# Patient Record
Sex: MALE | Race: BLACK OR AFRICAN AMERICAN | HISPANIC OR LATINO
[De-identification: names, ages, dates, MRNs, and addresses within clinical notes are randomized per-mention and may not be internally consistent; named-entity substitution may affect disease eponyms.]

---

## 2017-01-24 ENCOUNTER — TRANSCRIPTION ENCOUNTER (OUTPATIENT)
Age: 22
End: 2017-01-24

## 2017-01-24 ENCOUNTER — EMERGENCY (EMERGENCY)
Facility: HOSPITAL | Age: 22
LOS: 1 days | Discharge: LEFT BEFORE DISCHARGE | End: 2017-01-24
Admitting: EMERGENCY MEDICINE

## 2017-01-24 VITALS
RESPIRATION RATE: 18 BRPM | OXYGEN SATURATION: 99 % | TEMPERATURE: 99 F | SYSTOLIC BLOOD PRESSURE: 136 MMHG | HEART RATE: 78 BPM | HEIGHT: 76 IN | WEIGHT: 175.05 LBS | DIASTOLIC BLOOD PRESSURE: 81 MMHG
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
## 2017-01-27 ENCOUNTER — TRANSCRIPTION ENCOUNTER (OUTPATIENT)
Age: 22
End: 2017-01-27

## 2017-06-26 ENCOUNTER — EMERGENCY (EMERGENCY)
Facility: HOSPITAL | Age: 22
LOS: 1 days | Discharge: LEFT BEFORE DISCHARGE | End: 2017-06-26
Admitting: EMERGENCY MEDICINE

## 2017-06-26 VITALS
SYSTOLIC BLOOD PRESSURE: 121 MMHG | RESPIRATION RATE: 18 BRPM | HEIGHT: 76 IN | WEIGHT: 164.91 LBS | OXYGEN SATURATION: 97 % | HEART RATE: 85 BPM | DIASTOLIC BLOOD PRESSURE: 85 MMHG | TEMPERATURE: 98 F

## 2017-06-26 NOTE — ED ADULT TRIAGE NOTE - CHIEF COMPLAINT QUOTE
c/o lightheaded, feels dehydrated and his heart racing, states was drinking couple of hrs ago beer and couple of shots,

## 2017-06-27 ENCOUNTER — EMERGENCY (EMERGENCY)
Facility: HOSPITAL | Age: 22
LOS: 1 days | Discharge: HOME | End: 2017-06-27
Attending: EMERGENCY MEDICINE
Payer: MEDICAID

## 2017-06-27 VITALS
RESPIRATION RATE: 18 BRPM | SYSTOLIC BLOOD PRESSURE: 126 MMHG | DIASTOLIC BLOOD PRESSURE: 76 MMHG | HEART RATE: 65 BPM | OXYGEN SATURATION: 100 %

## 2017-06-27 VITALS
RESPIRATION RATE: 18 BRPM | TEMPERATURE: 98 F | WEIGHT: 164.91 LBS | HEIGHT: 76 IN | HEART RATE: 120 BPM | OXYGEN SATURATION: 100 % | DIASTOLIC BLOOD PRESSURE: 96 MMHG | SYSTOLIC BLOOD PRESSURE: 156 MMHG

## 2017-06-27 LAB
ALBUMIN SERPL ELPH-MCNC: 4.6 G/DL — SIGNIFICANT CHANGE UP (ref 3.3–5.2)
ALP SERPL-CCNC: 57 U/L — SIGNIFICANT CHANGE UP (ref 40–120)
ALT FLD-CCNC: 16 U/L — SIGNIFICANT CHANGE UP
ANION GAP SERPL CALC-SCNC: 21 MMOL/L — HIGH (ref 5–17)
AST SERPL-CCNC: 34 U/L — SIGNIFICANT CHANGE UP
BASOPHILS # BLD AUTO: 0 K/UL — SIGNIFICANT CHANGE UP (ref 0–0.2)
BASOPHILS NFR BLD AUTO: 0.7 % — SIGNIFICANT CHANGE UP (ref 0–2)
BILIRUB SERPL-MCNC: 0.5 MG/DL — SIGNIFICANT CHANGE UP (ref 0.4–2)
BUN SERPL-MCNC: 11 MG/DL — SIGNIFICANT CHANGE UP (ref 8–20)
CALCIUM SERPL-MCNC: 9.5 MG/DL — SIGNIFICANT CHANGE UP (ref 8.6–10.2)
CHLORIDE SERPL-SCNC: 97 MMOL/L — LOW (ref 98–107)
CO2 SERPL-SCNC: 17 MMOL/L — LOW (ref 22–29)
CREAT SERPL-MCNC: 0.96 MG/DL — SIGNIFICANT CHANGE UP (ref 0.5–1.3)
EOSINOPHIL # BLD AUTO: 0.2 K/UL — SIGNIFICANT CHANGE UP (ref 0–0.5)
EOSINOPHIL NFR BLD AUTO: 2.9 % — SIGNIFICANT CHANGE UP (ref 0–5)
ETHANOL SERPL-MCNC: <10 MG/DL — SIGNIFICANT CHANGE UP
GLUCOSE SERPL-MCNC: 90 MG/DL — SIGNIFICANT CHANGE UP (ref 70–115)
HCT VFR BLD CALC: 42.7 % — SIGNIFICANT CHANGE UP (ref 42–52)
HGB BLD-MCNC: 14.6 G/DL — SIGNIFICANT CHANGE UP (ref 14–18)
LYMPHOCYTES # BLD AUTO: 3 K/UL — SIGNIFICANT CHANGE UP (ref 1–4.8)
LYMPHOCYTES # BLD AUTO: 40.7 % — SIGNIFICANT CHANGE UP (ref 20–55)
MCHC RBC-ENTMCNC: 28.7 PG — SIGNIFICANT CHANGE UP (ref 27–31)
MCHC RBC-ENTMCNC: 34.2 G/DL — SIGNIFICANT CHANGE UP (ref 32–36)
MCV RBC AUTO: 84.1 FL — SIGNIFICANT CHANGE UP (ref 80–94)
MONOCYTES # BLD AUTO: 0.4 K/UL — SIGNIFICANT CHANGE UP (ref 0–0.8)
MONOCYTES NFR BLD AUTO: 5.9 % — SIGNIFICANT CHANGE UP (ref 3–10)
NEUTROPHILS # BLD AUTO: 3.6 K/UL — SIGNIFICANT CHANGE UP (ref 1.8–8)
NEUTROPHILS NFR BLD AUTO: 49.5 % — SIGNIFICANT CHANGE UP (ref 37–73)
PLATELET # BLD AUTO: 190 K/UL — SIGNIFICANT CHANGE UP (ref 150–400)
POTASSIUM SERPL-MCNC: 4.3 MMOL/L — SIGNIFICANT CHANGE UP (ref 3.5–5.3)
POTASSIUM SERPL-SCNC: 4.3 MMOL/L — SIGNIFICANT CHANGE UP (ref 3.5–5.3)
PROT SERPL-MCNC: 7.9 G/DL — SIGNIFICANT CHANGE UP (ref 6.6–8.7)
RBC # BLD: 5.08 M/UL — SIGNIFICANT CHANGE UP (ref 4.6–6.2)
RBC # FLD: 13.6 % — SIGNIFICANT CHANGE UP (ref 11–15.6)
SODIUM SERPL-SCNC: 135 MMOL/L — SIGNIFICANT CHANGE UP (ref 135–145)
T4 AB SER-ACNC: 4.6 UG/DL — SIGNIFICANT CHANGE UP (ref 4.5–12)
TROPONIN T SERPL-MCNC: <0.01 NG/ML — SIGNIFICANT CHANGE UP (ref 0–0.06)
TSH SERPL-MCNC: 1.7 UIU/ML — SIGNIFICANT CHANGE UP (ref 0.27–4.2)
WBC # BLD: 7.3 K/UL — SIGNIFICANT CHANGE UP (ref 4.8–10.8)
WBC # FLD AUTO: 7.3 K/UL — SIGNIFICANT CHANGE UP (ref 4.8–10.8)

## 2017-06-27 PROCEDURE — 80307 DRUG TEST PRSMV CHEM ANLYZR: CPT

## 2017-06-27 PROCEDURE — 99284 EMERGENCY DEPT VISIT MOD MDM: CPT | Mod: 25

## 2017-06-27 PROCEDURE — 80053 COMPREHEN METABOLIC PANEL: CPT

## 2017-06-27 PROCEDURE — 36415 COLL VENOUS BLD VENIPUNCTURE: CPT

## 2017-06-27 PROCEDURE — 85027 COMPLETE CBC AUTOMATED: CPT

## 2017-06-27 PROCEDURE — 84484 ASSAY OF TROPONIN QUANT: CPT

## 2017-06-27 PROCEDURE — 99285 EMERGENCY DEPT VISIT HI MDM: CPT | Mod: 25

## 2017-06-27 PROCEDURE — 84443 ASSAY THYROID STIM HORMONE: CPT

## 2017-06-27 PROCEDURE — 84436 ASSAY OF TOTAL THYROXINE: CPT

## 2017-06-27 PROCEDURE — 96374 THER/PROPH/DIAG INJ IV PUSH: CPT

## 2017-06-27 RX ORDER — MAGNESIUM OXIDE 400 MG ORAL TABLET 241.3 MG
0 TABLET ORAL
Qty: 0 | Refills: 0 | COMMUNITY

## 2017-06-27 RX ORDER — SODIUM CHLORIDE 9 MG/ML
2000 INJECTION INTRAMUSCULAR; INTRAVENOUS; SUBCUTANEOUS ONCE
Qty: 0 | Refills: 0 | Status: COMPLETED | OUTPATIENT
Start: 2017-06-27 | End: 2017-06-27

## 2017-06-27 RX ADMIN — SODIUM CHLORIDE 2000 MILLILITER(S): 9 INJECTION INTRAMUSCULAR; INTRAVENOUS; SUBCUTANEOUS at 05:41

## 2017-06-27 NOTE — ED PROVIDER NOTE - PROGRESS NOTE DETAILS
after valium and nonrebreather mask - symptoms resolved and tachycardia resolved.  d/c home with instruction on anxiety, hyperventiltion, and binge drinking.

## 2017-06-27 NOTE — ED PROVIDER NOTE - OBJECTIVE STATEMENT
23 yo male with a h/o anxiety complains of palpitations.  he admits to binge drinking for past 2 nites.  denies any pain, sob, n/v.  similar symptoms in past.  came to ER earlier but walked out bc feeling better.   symptoms occurred while at rest...this time with assoc. paresthesias to both hands/feet, and carpopedal spasm.

## 2017-06-27 NOTE — ED PROVIDER NOTE - CONSTITUTIONAL, MLM
normal... Well appearing, well nourished, awake, alert, oriented to person, place, time/situation and in no apparent distress. anxious.

## 2017-06-27 NOTE — ED ADULT TRIAGE NOTE - CHIEF COMPLAINT QUOTE
pt returned from earlier c/o palpation states he went home and sleep but woke up with palpation, denies any drugs

## 2018-01-02 ENCOUNTER — TRANSCRIPTION ENCOUNTER (OUTPATIENT)
Age: 23
End: 2018-01-02

## 2019-05-02 ENCOUNTER — EMERGENCY (EMERGENCY)
Facility: HOSPITAL | Age: 24
LOS: 1 days | Discharge: HOME | End: 2019-05-02
Attending: EMERGENCY MEDICINE
Payer: MEDICAID

## 2019-05-02 VITALS
RESPIRATION RATE: 18 BRPM | SYSTOLIC BLOOD PRESSURE: 114 MMHG | HEART RATE: 164 BPM | DIASTOLIC BLOOD PRESSURE: 72 MMHG | WEIGHT: 145.06 LBS | TEMPERATURE: 98 F | HEIGHT: 76 IN

## 2019-05-02 LAB
BASOPHILS # BLD AUTO: 0 K/UL — SIGNIFICANT CHANGE UP (ref 0–0.2)
BASOPHILS NFR BLD AUTO: 0.2 % — SIGNIFICANT CHANGE UP (ref 0–2)
EOSINOPHIL # BLD AUTO: 0 K/UL — SIGNIFICANT CHANGE UP (ref 0–0.5)
EOSINOPHIL NFR BLD AUTO: 0.1 % — SIGNIFICANT CHANGE UP (ref 0–5)
HCT VFR BLD CALC: 42.6 % — SIGNIFICANT CHANGE UP (ref 42–52)
HGB BLD-MCNC: 14.3 G/DL — SIGNIFICANT CHANGE UP (ref 14–18)
LYMPHOCYTES # BLD AUTO: 1.3 K/UL — SIGNIFICANT CHANGE UP (ref 1–4.8)
LYMPHOCYTES # BLD AUTO: 12.7 % — LOW (ref 20–55)
MCHC RBC-ENTMCNC: 28.4 PG — SIGNIFICANT CHANGE UP (ref 27–31)
MCHC RBC-ENTMCNC: 33.6 G/DL — SIGNIFICANT CHANGE UP (ref 32–36)
MCV RBC AUTO: 84.7 FL — SIGNIFICANT CHANGE UP (ref 80–94)
MONOCYTES # BLD AUTO: 0.6 K/UL — SIGNIFICANT CHANGE UP (ref 0–0.8)
MONOCYTES NFR BLD AUTO: 5.7 % — SIGNIFICANT CHANGE UP (ref 3–10)
NEUTROPHILS # BLD AUTO: 8.6 K/UL — HIGH (ref 1.8–8)
NEUTROPHILS NFR BLD AUTO: 81.2 % — HIGH (ref 37–73)
PLATELET # BLD AUTO: 210 K/UL — SIGNIFICANT CHANGE UP (ref 150–400)
RBC # BLD: 5.03 M/UL — SIGNIFICANT CHANGE UP (ref 4.6–6.2)
RBC # FLD: 13.3 % — SIGNIFICANT CHANGE UP (ref 11–15.6)
WBC # BLD: 10.6 K/UL — SIGNIFICANT CHANGE UP (ref 4.8–10.8)
WBC # FLD AUTO: 10.6 K/UL — SIGNIFICANT CHANGE UP (ref 4.8–10.8)

## 2019-05-02 PROCEDURE — 93010 ELECTROCARDIOGRAM REPORT: CPT

## 2019-05-02 PROCEDURE — 99285 EMERGENCY DEPT VISIT HI MDM: CPT | Mod: 25

## 2019-05-02 PROCEDURE — 71045 X-RAY EXAM CHEST 1 VIEW: CPT | Mod: 26

## 2019-05-02 RX ORDER — SODIUM CHLORIDE 9 MG/ML
1000 INJECTION INTRAMUSCULAR; INTRAVENOUS; SUBCUTANEOUS ONCE
Qty: 0 | Refills: 0 | Status: COMPLETED | OUTPATIENT
Start: 2019-05-02 | End: 2019-05-02

## 2019-05-02 RX ADMIN — SODIUM CHLORIDE 2000 MILLILITER(S): 9 INJECTION INTRAMUSCULAR; INTRAVENOUS; SUBCUTANEOUS at 23:31

## 2019-05-02 NOTE — ED STATDOCS - PROGRESS NOTE DETAILS
23 y/o M with PMHx anxiety attacks, presents to ED c/o palpitations. Patient used a weed pen around 2pm today, and may have ingested THC from pen. Then had 2 shots of Tequila one hour ago. Symptoms started 2-3 hours after THC ingestion, as anxiety. Out of nowhere symptoms began, and now it is worse than any other anxiety attack he has had. Reports that he has dry mouth right now and that the palpitations come and go. States chest feels weird, but not as if "someone is stepping on his chest". Patient is frightened and is scared and feels like his legs cannot move.   Denies, N/V/D.

## 2019-05-02 NOTE — ED PROVIDER NOTE - OBJECTIVE STATEMENT
25 y/o M pt with hx of  presents to ED c/o acute onset of palpitations and difficulty breathing earlier today followed by feeling of zoning out . Pt hd similar symptoms in the past described as anxiety. zoned out sensation,.  Pt eat fast. Does not drink coffee. Pt states he uses marijuana and may have inhaled something with testing. Pt has been admitted to ED in the past for anxiety. He is s/p cardiology workup in past, including heart monitor and stress test with negative results.     petron x2 shots week or 2 olld. 23 y/o M pt with hx of anxiety presents to ED c/o acute onset of palpitations and difficulty breathing earlier today followed by feeling of zoning out. Pt had similar symptoms in the past described as anxiety.   Pt states he has been seen by  his PCP for his anxiety, with multiple past ED admissions for episodes similar to presentation today. Pt reports eating his food fast today but denies coffee intake. Pt states he frequently smokes marijuana and tobacco and believes he may have injected a contaminated drug sample earlier today. Pt took x2 shots of a drink that he believes was past its due date. He is s/p cardiology workup in past, including heart monitor and stress test with negative results. He states he has no CP, N/V, syncope.

## 2019-05-02 NOTE — ED PROVIDER NOTE - CLINICAL SUMMARY MEDICAL DECISION MAKING FREE TEXT BOX
25 y/o with hx of marijuana and tobacco use, states he took "2 shots", he also states he may have inhale contaminated drug. Plan to hydrate pt, check electrolytes and re-eval.

## 2019-05-02 NOTE — ED PROVIDER NOTE - PHYSICAL EXAMINATION
Constitutional: Appears anxious, talking in full sentences. Pt slim  Head: NC/AT, no swelling  Eyes: EOMI, no swelling  Mouth: mm dry  Neck: supple, trachea is midline  Chest: Bilateral air entry, symmetrical chest expansion, no distress, chest is lean cylinder and clear.   Heart: S1 S2 distant  Abdomen: abd soft, non tender  Musc/Skel: extremities with no swelling, no deformity, no spine tenderness, distal pulses present  Neuro: A&Ox3, no focal deficits

## 2019-05-02 NOTE — ED PROVIDER NOTE - NS ED ROS FT
no weight change, no fever or chills  no recent change in medications  no rash, no bruises  no visual changes no eye discharge  (+) sob,(+) palpitations, no chest pain  follows with cardiology  (+) stress test, no cath  no orthopnea, no pnd  no abd pain, no n/v/d  no hematuria, no change in urinary habits  no joint pain, no deformity  no headache, no paresthesia

## 2019-05-03 VITALS
HEART RATE: 74 BPM | DIASTOLIC BLOOD PRESSURE: 74 MMHG | TEMPERATURE: 98 F | RESPIRATION RATE: 16 BRPM | SYSTOLIC BLOOD PRESSURE: 116 MMHG | OXYGEN SATURATION: 100 %

## 2019-05-03 LAB
ALBUMIN SERPL ELPH-MCNC: 4.6 G/DL — SIGNIFICANT CHANGE UP (ref 3.3–5.2)
ALP SERPL-CCNC: 64 U/L — SIGNIFICANT CHANGE UP (ref 40–120)
ALT FLD-CCNC: 25 U/L — SIGNIFICANT CHANGE UP
AMPHET UR-MCNC: NEGATIVE — SIGNIFICANT CHANGE UP
ANION GAP SERPL CALC-SCNC: 13 MMOL/L — SIGNIFICANT CHANGE UP (ref 5–17)
APPEARANCE UR: CLEAR — SIGNIFICANT CHANGE UP
AST SERPL-CCNC: 20 U/L — SIGNIFICANT CHANGE UP
BARBITURATES UR SCN-MCNC: NEGATIVE — SIGNIFICANT CHANGE UP
BENZODIAZ UR-MCNC: NEGATIVE — SIGNIFICANT CHANGE UP
BILIRUB SERPL-MCNC: <0.2 MG/DL — LOW (ref 0.4–2)
BILIRUB UR-MCNC: NEGATIVE — SIGNIFICANT CHANGE UP
BUN SERPL-MCNC: 11 MG/DL — SIGNIFICANT CHANGE UP (ref 8–20)
CALCIUM SERPL-MCNC: 9.5 MG/DL — SIGNIFICANT CHANGE UP (ref 8.6–10.2)
CHLORIDE SERPL-SCNC: 105 MMOL/L — SIGNIFICANT CHANGE UP (ref 98–107)
CO2 SERPL-SCNC: 24 MMOL/L — SIGNIFICANT CHANGE UP (ref 22–29)
COCAINE METAB.OTHER UR-MCNC: NEGATIVE — SIGNIFICANT CHANGE UP
COLOR SPEC: YELLOW — SIGNIFICANT CHANGE UP
CREAT SERPL-MCNC: 1.08 MG/DL — SIGNIFICANT CHANGE UP (ref 0.5–1.3)
DIFF PNL FLD: NEGATIVE — SIGNIFICANT CHANGE UP
GLUCOSE SERPL-MCNC: 85 MG/DL — SIGNIFICANT CHANGE UP (ref 70–115)
GLUCOSE UR QL: NEGATIVE MG/DL — SIGNIFICANT CHANGE UP
KETONES UR-MCNC: NEGATIVE — SIGNIFICANT CHANGE UP
LEUKOCYTE ESTERASE UR-ACNC: NEGATIVE — SIGNIFICANT CHANGE UP
METHADONE UR-MCNC: NEGATIVE — SIGNIFICANT CHANGE UP
NITRITE UR-MCNC: NEGATIVE — SIGNIFICANT CHANGE UP
OPIATES UR-MCNC: NEGATIVE — SIGNIFICANT CHANGE UP
PCP SPEC-MCNC: SIGNIFICANT CHANGE UP
PCP UR-MCNC: NEGATIVE — SIGNIFICANT CHANGE UP
PH UR: 6 — SIGNIFICANT CHANGE UP (ref 5–8)
POTASSIUM SERPL-MCNC: 4.7 MMOL/L — SIGNIFICANT CHANGE UP (ref 3.5–5.3)
POTASSIUM SERPL-SCNC: 4.7 MMOL/L — SIGNIFICANT CHANGE UP (ref 3.5–5.3)
PROT SERPL-MCNC: 7.8 G/DL — SIGNIFICANT CHANGE UP (ref 6.6–8.7)
PROT UR-MCNC: NEGATIVE MG/DL — SIGNIFICANT CHANGE UP
SODIUM SERPL-SCNC: 142 MMOL/L — SIGNIFICANT CHANGE UP (ref 135–145)
SP GR SPEC: 1.01 — SIGNIFICANT CHANGE UP (ref 1.01–1.02)
THC UR QL: POSITIVE
TROPONIN T SERPL-MCNC: <0.01 NG/ML — SIGNIFICANT CHANGE UP (ref 0–0.06)
TSH SERPL-MCNC: 0.73 UIU/ML — SIGNIFICANT CHANGE UP (ref 0.27–4.2)
UROBILINOGEN FLD QL: NEGATIVE MG/DL — SIGNIFICANT CHANGE UP

## 2019-05-03 PROCEDURE — 99284 EMERGENCY DEPT VISIT MOD MDM: CPT

## 2019-05-03 PROCEDURE — 93005 ELECTROCARDIOGRAM TRACING: CPT

## 2019-05-03 PROCEDURE — 85027 COMPLETE CBC AUTOMATED: CPT

## 2019-05-03 PROCEDURE — 71045 X-RAY EXAM CHEST 1 VIEW: CPT

## 2019-05-03 PROCEDURE — 80307 DRUG TEST PRSMV CHEM ANLYZR: CPT

## 2019-05-03 PROCEDURE — 84443 ASSAY THYROID STIM HORMONE: CPT

## 2019-05-03 PROCEDURE — 81003 URINALYSIS AUTO W/O SCOPE: CPT

## 2019-05-03 PROCEDURE — 36415 COLL VENOUS BLD VENIPUNCTURE: CPT

## 2019-05-03 PROCEDURE — 93010 ELECTROCARDIOGRAM REPORT: CPT

## 2019-05-03 PROCEDURE — 84484 ASSAY OF TROPONIN QUANT: CPT

## 2019-05-03 PROCEDURE — 80053 COMPREHEN METABOLIC PANEL: CPT

## 2019-05-03 PROCEDURE — G0378: CPT

## 2019-05-03 PROCEDURE — 99218: CPT

## 2019-05-03 RX ADMIN — SODIUM CHLORIDE 1000 MILLILITER(S): 9 INJECTION INTRAMUSCULAR; INTRAVENOUS; SUBCUTANEOUS at 00:01

## 2019-05-03 NOTE — ED ADULT NURSE REASSESSMENT NOTE - NS ED NURSE REASSESS COMMENT FT1
Pt alert and oriented. resting in stretcher, no signs of distress noted. Handoff report given to Saida Ellis RN and pt's safety maintained. RN with no questions or concerns at this time
assumed pt care from previous Rn Ashley Lombardi.  pt transported to CDU-11 for observation. pt  A&Ox3;  resting in stretcher, with no complaints of pain or discomfort. B/L lungs clear, normal s1&s2 heard on ausculation. (+) pedal pulses, skin warm/dry intact. IV patent. VSS and documented as per flow sheet. KATHY Khan at bedside.  plan of care reviewed and pt verbalizes understanding. bed in lowest position, call bell within reach and safety maintained. monitoring ongoing for any changes.
pt asleep, easily arousable. a&ox3, denies any further chest pain/sob or palpitations. pending further tx. updated on plan of care, verbalize understanding. call bell in reach
Assumed care of the patient at 0730. Patient A&Ox3.No s/s of distress. NSR on CM. Denies any CP or SOB at this time. Lungs clear B/L on auscultation. Abdomen soft and nondistended. No peripheral edema noted. VSS. Awaiting rpt trop/EKG and cards consult. Resting comfortably. Meal tray provided. Patient in understanding of plan of care. Patient with no further questions for the nurse Will continue to monitor.

## 2019-05-03 NOTE — ED CDU PROVIDER DISPOSITION NOTE - CLINICAL COURSE
pt is a 25 y/o M with hx of anxiety presents to ED c/o acute onset of palpitations and difficulty breathing earlier today followed by feeling of zoning out. Pt had similar symptoms in the past described as anxiety.   Pt states he has been seen by  his PCP for his anxiety, with multiple past ED admissions for episodes similar to presentation today. Pt reports eating his food fast today but denies coffee intake. Pt states he frequently smokes marijuana and tobacco and believes he may have injected a contaminated drug sample earlier today. Pt took x2 shots of a drink that he believes was past its due date. He is s/p cardiology workup in past, including heart monitor and stress test with negative results. He states he has no CP, N/V, syncope. He states he is feeling better. Spoke with Dr. Handy from [facility] and she reports pt can follow up as outpatient and we do not need third trop. Oleksandr lewis.

## 2019-05-03 NOTE — ED ADULT NURSE NOTE - OBJECTIVE STATEMENT
Assumed patient care at this time due to acuity of room. 23 yo male c/o "heart racing and episode of SOB" after taking 2 shots of tequila and eating shrimp and rice. pt states he felt it was an anxiety attack as he has had in the past. pt reports only taking the 2 shots, denies any other alcohol or drug use. at this time pt states he feels much better, "wants to eat and cotton mouth" pt skin warm and dry, color appropriate for race, resp spontaneous, even and unlabored, lungs clear to ausc. abd soft non tender non distended + bs x 4. FITZGERALD x 4, PERRL. no BLE edema. denies si/hi. pt states he has a hx of anxiety, but is "no longer treated for it" NSR on cardiac monitor

## 2019-05-03 NOTE — ED CDU PROVIDER INITIAL DAY NOTE - OBJECTIVE STATEMENT
23 y/o M pt with hx of anxiety presents to ED c/o acute onset of palpitations and difficulty breathing earlier today followed by feeling of zoning out. Pt had similar symptoms in the past described as anxiety.   Pt states he has been seen by  his PCP for his anxiety, with multiple past ED admissions for episodes similar to presentation today. Pt reports eating his food fast today but denies coffee intake. Pt states he frequently smokes marijuana and tobacco and believes he may have injected a contaminated drug sample earlier today. Pt took x2 shots of a drink that he believes was past its due date. He is s/p cardiology workup in past, including heart monitor and stress test with negative results. He states he has no CP, N/V, syncope.

## 2019-05-03 NOTE — ED CLERICAL - CLERICAL COMMENTS
consult placed to [place] cards to Dr. Kelley consult placed to [place] cards to Dr. Kelley. [facility] called for consult.

## 2019-05-03 NOTE — ED ADULT NURSE NOTE - NSIMPLEMENTINTERV_GEN_ALL_ED
Implemented All Universal Safety Interventions:  [place] to call system. Call bell, personal items and telephone within reach. Instruct patient to call for assistance. Room bathroom lighting operational. Non-slip footwear when patient is off stretcher. Physically safe environment: no spills, clutter or unnecessary equipment. Stretcher in lowest position, wheels locked, appropriate side rails in place.

## 2020-11-25 ENCOUNTER — EMERGENCY (EMERGENCY)
Facility: HOSPITAL | Age: 25
LOS: 1 days | Discharge: HOME | End: 2020-11-25
Attending: EMERGENCY MEDICINE
Payer: MEDICARE

## 2020-11-25 VITALS
HEART RATE: 85 BPM | SYSTOLIC BLOOD PRESSURE: 139 MMHG | RESPIRATION RATE: 20 BRPM | TEMPERATURE: 98 F | OXYGEN SATURATION: 97 % | DIASTOLIC BLOOD PRESSURE: 83 MMHG

## 2020-11-25 VITALS — WEIGHT: 169.98 LBS | HEIGHT: 76 IN

## 2020-11-25 PROBLEM — F41.9 ANXIETY DISORDER, UNSPECIFIED: Chronic | Status: ACTIVE | Noted: 2019-05-03

## 2020-11-25 LAB — SARS-COV-2 RNA SPEC QL NAA+PROBE: DETECTED

## 2020-11-25 PROCEDURE — 71045 X-RAY EXAM CHEST 1 VIEW: CPT

## 2020-11-25 PROCEDURE — U0003: CPT

## 2020-11-25 PROCEDURE — 99283 EMERGENCY DEPT VISIT LOW MDM: CPT | Mod: 25

## 2020-11-25 PROCEDURE — 99284 EMERGENCY DEPT VISIT MOD MDM: CPT

## 2020-11-25 PROCEDURE — 71045 X-RAY EXAM CHEST 1 VIEW: CPT | Mod: 26

## 2020-11-25 NOTE — ED ADULT TRIAGE NOTE - CHIEF COMPLAINT QUOTE
Pt A&Ox4 states "I have a bad cough and I had a fever a few days ago and I just got back from [place]." Patient has cough, fever with recent travel.

## 2020-11-25 NOTE — ED PROVIDER NOTE - ENMT, MLM
Airway patent, Nasal mucosa clear. Mouth with normal mucosa. Throat has no vesicles, no oropharyngeal exudates and uvula is midline. TM clear B/l

## 2020-11-25 NOTE — ED PROVIDER NOTE - OBJECTIVE STATEMENT
25 y.o male pmh of anxiety came back from the [place] today am  and c.o fever x 3 days ago and cough. feels like he has flu . he took day quill and night quill that did help hem . states he did not take temp . denies any sick contact . request the covid test . 25 y.o male PMHx of anxiety came back from the [place] today am  and c.o fever x 3 days ago and cough. feels like he has flu . he took day quill and night quill that did help hem . states he did not take temp . denies any sick contact . request the covid test .

## 2020-11-25 NOTE — ED PROVIDER NOTE - PATIENT PORTAL LINK FT
You can access the FollowMyHealth Patient Portal offered by [facility] by registering at the following website: [email or web address]. By joining [employer]’s FollowMyHealth portal, you will also be able to view your health information using other applications (apps) compatible with our system.

## 2020-11-25 NOTE — ED PROVIDER NOTE - CLINICAL SUMMARY MEDICAL DECISION MAKING FREE TEXT BOX
25 y.o male pmh of anxiety came back from the [place] today am  and c.o fever x 3 days ago and cough.  covid ,cxr

## 2021-08-11 ENCOUNTER — EMERGENCY (EMERGENCY)
Facility: HOSPITAL | Age: 26
LOS: 1 days | Discharge: HOME | End: 2021-08-11
Payer: COMMERCIAL

## 2021-08-11 VITALS
SYSTOLIC BLOOD PRESSURE: 120 MMHG | TEMPERATURE: 98 F | HEIGHT: 76 IN | DIASTOLIC BLOOD PRESSURE: 80 MMHG | RESPIRATION RATE: 18 BRPM | OXYGEN SATURATION: 96 % | HEART RATE: 75 BPM | WEIGHT: 191.8 LBS

## 2021-08-11 LAB — SARS-COV-2 RNA SPEC QL NAA+PROBE: SIGNIFICANT CHANGE UP

## 2021-08-11 PROCEDURE — 87635 SARS-COV-2 COVID-19 AMP PRB: CPT

## 2021-08-11 PROCEDURE — 99283 EMERGENCY DEPT VISIT LOW MDM: CPT

## 2021-08-11 NOTE — ED PROVIDER NOTE - HIV OFFER
CC/F/U for:    INTERVAL HPI/OVERNIGHT EVENTS:  Pt seen and examined at bedside.     Allergies/Intolerance: No Known Allergies      MEDICATIONS  (STANDING):  azithromycin  IVPB 500milliGRAM(s) IV Intermittent every 24 hours  enoxaparin Injectable 40milliGRAM(s) SubCutaneous every 24 hours  chlorhexidine 4% Liquid 1Application(s) Topical daily  dextrose 5%. 1000milliLiter(s) IV Continuous <Continuous>  dextrose 50% Injectable 12.5Gram(s) IV Push once  dextrose 50% Injectable 25Gram(s) IV Push once  dextrose 50% Injectable 25Gram(s) IV Push once  aspirin  chewable 81milliGRAM(s) Oral daily  tiotropium 18 MICROgram(s) Capsule 1Capsule(s) Inhalation daily  fluticasone / salmeterol 250-50 MICROgram(s) Diskus 1Dose(s) Inhalation two times a day  diltiazem    Tablet 30milliGRAM(s) Oral every 6 hours  simvastatin 10milliGRAM(s) Oral at bedtime  losartan 50milliGRAM(s) Oral daily  pantoprazole    Tablet 40milliGRAM(s) Oral before breakfast  tamsulosin 0.4milliGRAM(s) Oral at bedtime  insulin lispro (HumaLOG) corrective regimen sliding scale  SubCutaneous three times a day before meals  insulin lispro (HumaLOG) corrective regimen sliding scale  SubCutaneous at bedtime  insulin glargine Injectable (LANTUS) 12Unit(s) SubCutaneous at bedtime  ALBUTerol/ipratropium for Nebulization 3milliLiter(s) Nebulizer every 6 hours  acetylcysteine 10% Inhalation 2milliLiter(s) Inhalation every 6 hours  docusate sodium 100milliGRAM(s) Oral two times a day  methylPREDNISolone sodium succinate Injectable 20milliGRAM(s) IV Push every 8 hours    MEDICATIONS  (PRN):  dextrose Gel 1Dose(s) Oral once PRN Blood Glucose LESS THAN 70 milliGRAM(s)/deciliter  glucagon  Injectable 1milliGRAM(s) IntraMuscular once PRN Glucose LESS THAN 70 milligrams/deciliter  ALBUTerol   0.042% 1.25milliGRAM(s) Nebulizer every 3 hours PRN Shortness of Breath and/or Wheezing  guaiFENesin   Syrup  (Sugar-Free) 200milliGRAM(s) Oral every 6 hours PRN Cough  sodium chloride 0.65% Nasal 1Spray(s) Both Nostrils three times a day PRN Nasal Congestion        ROS: all systems reviewed and wnl      PHYSICAL EXAMINATION:  Vital Signs Last 24 Hrs  T(C): 36.6, Max: 37.1 (02-21 @ 00:19)  T(F): 97.8, Max: 98.8 (02-21 @ 00:19)  HR: 80 (71 - 94)  BP: 126/61 (102/55 - 148/78)  BP(mean): --  RR: 17 (16 - 18)  SpO2: 98% (93% - 100%)  CAPILLARY BLOOD GLUCOSE  231 (21 Feb 2017 11:22)  117 (21 Feb 2017 08:03)  192 (20 Feb 2017 21:34)  129 (20 Feb 2017 17:41)      GENERAL: NAD, well-groomed, well-developed  HEAD:  atraumatic, normocephalic  EYES: sclera anicteric  ENMT: mucous membranes moist  NECK: supple, No JVD  CHEST/LUNG: clear to auscultation bilaterally; no rales, rhonchi, or wheezing b/l  HEART: normal S1, S2  ABDOMEN: BS+, soft, ND, NT   EXTREMITIES:  pulses palpable; no clubbing, cyanosis, or edema b/l LEs  NEURO: awake, alert, interactive; moves all extremities  SKIN: no rashes or lesions      LABS:                        11.3   14.1  )-----------( 369      ( 20 Feb 2017 07:51 )             34.2                   RADIOLOGY & ADDITIONAL TESTS:      ASSESSMENT AND PLAN: Previously Declined (within the last year) CC/F/U for:    INTERVAL HPI/OVERNIGHT EVENTS:  Pt seen and examined at bedside.     Allergies/Intolerance: No Known Allergies      MEDICATIONS  (STANDING):  azithromycin  IVPB 500milliGRAM(s) IV Intermittent every 24 hours  enoxaparin Injectable 40milliGRAM(s) SubCutaneous every 24 hours  chlorhexidine 4% Liquid 1Application(s) Topical daily  dextrose 5%. 1000milliLiter(s) IV Continuous <Continuous>  dextrose 50% Injectable 12.5Gram(s) IV Push once  dextrose 50% Injectable 25Gram(s) IV Push once  dextrose 50% Injectable 25Gram(s) IV Push once  aspirin  chewable 81milliGRAM(s) Oral daily  tiotropium 18 MICROgram(s) Capsule 1Capsule(s) Inhalation daily  fluticasone / salmeterol 250-50 MICROgram(s) Diskus 1Dose(s) Inhalation two times a day  diltiazem    Tablet 30milliGRAM(s) Oral every 6 hours  simvastatin 10milliGRAM(s) Oral at bedtime  losartan 50milliGRAM(s) Oral daily  pantoprazole    Tablet 40milliGRAM(s) Oral before breakfast  tamsulosin 0.4milliGRAM(s) Oral at bedtime  insulin lispro (HumaLOG) corrective regimen sliding scale  SubCutaneous three times a day before meals  insulin lispro (HumaLOG) corrective regimen sliding scale  SubCutaneous at bedtime  insulin glargine Injectable (LANTUS) 12Unit(s) SubCutaneous at bedtime  ALBUTerol/ipratropium for Nebulization 3milliLiter(s) Nebulizer every 6 hours  acetylcysteine 10% Inhalation 2milliLiter(s) Inhalation every 6 hours  docusate sodium 100milliGRAM(s) Oral two times a day  methylPREDNISolone sodium succinate Injectable 20milliGRAM(s) IV Push every 8 hours    MEDICATIONS  (PRN):  dextrose Gel 1Dose(s) Oral once PRN Blood Glucose LESS THAN 70 milliGRAM(s)/deciliter  glucagon  Injectable 1milliGRAM(s) IntraMuscular once PRN Glucose LESS THAN 70 milligrams/deciliter  ALBUTerol   0.042% 1.25milliGRAM(s) Nebulizer every 3 hours PRN Shortness of Breath and/or Wheezing  guaiFENesin   Syrup  (Sugar-Free) 200milliGRAM(s) Oral every 6 hours PRN Cough  sodium chloride 0.65% Nasal 1Spray(s) Both Nostrils three times a day PRN Nasal Congestion        ROS: all systems reviewed and wnl      PHYSICAL EXAMINATION:  Vital Signs Last 24 Hrs  T(C): 36.6, Max: 37.1 (02-21 @ 00:19)  T(F): 97.8, Max: 98.8 (02-21 @ 00:19)  HR: 80 (71 - 94)  BP: 126/61 (102/55 - 148/78)  BP(mean): --  RR: 17 (16 - 18)  SpO2: 98% (93% - 100%)  CAPILLARY BLOOD GLUCOSE  231 (21 Feb 2017 11:22)  117 (21 Feb 2017 08:03)  192 (20 Feb 2017 21:34)  129 (20 Feb 2017 17:41)      GENERAL: NAD, well-groomed, well-developed  HEAD:  atraumatic, normocephalic  EYES: sclera anicteric  ENMT: mucous membranes moist  NECK: supple, No JVD  CHEST/LUNG: clear to auscultation bilaterally; no rales, rhonchi, or wheezing b/l  HEART: normal S1, S2  ABDOMEN: BS+, soft, ND, NT   EXTREMITIES:  pulses palpable; no clubbing, cyanosis, or edema b/l LEs  NEURO: awake, alert, interactive; moves all extremities  SKIN: no rashes or lesions      LABS:                        11.3   14.1  )-----------( 369      ( 20 Feb 2017 07:51 )             34.2                   RADIOLOGY & ADDITIONAL TESTS:  None      ASSESSMENT AND PLAN:  60 year old female with COPD exacerbation. Continue IV Zithromax, IV Solumedrol 20 mg every 8 hours, Advair and Spiriva daily.  DM controlled on Lantus 12 units every day. Change cardizem to 120 mg/day. Continue Cozaar 50 mg/day. CC/F/U for: Stable in bed on oxygen. NAD. No SOB at rest. No cough or CP.     INTERVAL HPI/OVERNIGHT EVENTS:  Pt seen and examined at bedside.     Allergies/Intolerance: No Known Allergies      MEDICATIONS  (STANDING):  azithromycin  IVPB 500milliGRAM(s) IV Intermittent every 24 hours  enoxaparin Injectable 40milliGRAM(s) SubCutaneous every 24 hours  chlorhexidine 4% Liquid 1Application(s) Topical daily  dextrose 5%. 1000milliLiter(s) IV Continuous <Continuous>  dextrose 50% Injectable 12.5Gram(s) IV Push once  dextrose 50% Injectable 25Gram(s) IV Push once  dextrose 50% Injectable 25Gram(s) IV Push once  aspirin  chewable 81milliGRAM(s) Oral daily  tiotropium 18 MICROgram(s) Capsule 1Capsule(s) Inhalation daily  fluticasone / salmeterol 250-50 MICROgram(s) Diskus 1Dose(s) Inhalation two times a day  diltiazem    Tablet 30milliGRAM(s) Oral every 6 hours  simvastatin 10milliGRAM(s) Oral at bedtime  losartan 50milliGRAM(s) Oral daily  pantoprazole    Tablet 40milliGRAM(s) Oral before breakfast  tamsulosin 0.4milliGRAM(s) Oral at bedtime  insulin lispro (HumaLOG) corrective regimen sliding scale  SubCutaneous three times a day before meals  insulin lispro (HumaLOG) corrective regimen sliding scale  SubCutaneous at bedtime  insulin glargine Injectable (LANTUS) 12Unit(s) SubCutaneous at bedtime  ALBUTerol/ipratropium for Nebulization 3milliLiter(s) Nebulizer every 6 hours  acetylcysteine 10% Inhalation 2milliLiter(s) Inhalation every 6 hours  docusate sodium 100milliGRAM(s) Oral two times a day  methylPREDNISolone sodium succinate Injectable 20milliGRAM(s) IV Push every 8 hours    MEDICATIONS  (PRN):  dextrose Gel 1Dose(s) Oral once PRN Blood Glucose LESS THAN 70 milliGRAM(s)/deciliter  glucagon  Injectable 1milliGRAM(s) IntraMuscular once PRN Glucose LESS THAN 70 milligrams/deciliter  ALBUTerol   0.042% 1.25milliGRAM(s) Nebulizer every 3 hours PRN Shortness of Breath and/or Wheezing  guaiFENesin   Syrup  (Sugar-Free) 200milliGRAM(s) Oral every 6 hours PRN Cough  sodium chloride 0.65% Nasal 1Spray(s) Both Nostrils three times a day PRN Nasal Congestion        ROS: all systems reviewed and wnl      PHYSICAL EXAMINATION:  Vital Signs Last 24 Hrs  T(C): 36.6, Max: 37.1 (02-21 @ 00:19)  T(F): 97.8, Max: 98.8 (02-21 @ 00:19)  HR: 80 (71 - 94)  BP: 126/61 (102/55 - 148/78)  BP(mean): --  RR: 17 (16 - 18)  SpO2: 98% (93% - 100%)  CAPILLARY BLOOD GLUCOSE  231 (21 Feb 2017 11:22)  117 (21 Feb 2017 08:03)  192 (20 Feb 2017 21:34)  129 (20 Feb 2017 17:41)      GENERAL: NAD, well-groomed, well-developed  HEAD:  atraumatic, normocephalic  EYES: sclera anicteric  ENMT: mucous membranes moist  NECK: supple, No JVD  CHEST/LUNG: clear to auscultation bilaterally; no rales, rhonchi, or wheezing b/l  HEART: normal S1, S2  ABDOMEN: BS+, soft, ND, NT   EXTREMITIES:  pulses palpable; no clubbing, cyanosis, or edema b/l LEs  NEURO: awake, alert, interactive; moves all extremities  SKIN: no rashes or lesions      LABS:                        11.3   14.1  )-----------( 369      ( 20 Feb 2017 07:51 )             34.2                   RADIOLOGY & ADDITIONAL TESTS:  None      ASSESSMENT AND PLAN:  60 year old male with COPD exacerbation. Continue IV Zithromax, IV Solumedrol 20 mg every 8 hours, Advair and Spiriva daily.  DM controlled on Lantus 12 units every day. Change cardizem to 120 mg/day. Continue Cozaar 50 mg/day. Continue BIPAP 10/5 30 % at night. Possible change to oral prednisone taper in AM and discharge to home if OK with pulmonary.  Out of bed to chair daily. CC/F/U for: Stable in bed on oxygen. NAD. No SOB at rest. No cough or CP.     INTERVAL HPI/OVERNIGHT EVENTS:  Pt seen and examined at bedside.     Allergies/Intolerance: No Known Allergies      MEDICATIONS  (STANDING):  azithromycin  IVPB 500milliGRAM(s) IV Intermittent every 24 hours  enoxaparin Injectable 40milliGRAM(s) SubCutaneous every 24 hours  chlorhexidine 4% Liquid 1Application(s) Topical daily  dextrose 5%. 1000milliLiter(s) IV Continuous <Continuous>  dextrose 50% Injectable 12.5Gram(s) IV Push once  dextrose 50% Injectable 25Gram(s) IV Push once  dextrose 50% Injectable 25Gram(s) IV Push once  aspirin  chewable 81milliGRAM(s) Oral daily  tiotropium 18 MICROgram(s) Capsule 1Capsule(s) Inhalation daily  fluticasone / salmeterol 250-50 MICROgram(s) Diskus 1Dose(s) Inhalation two times a day  diltiazem    Tablet 30milliGRAM(s) Oral every 6 hours  simvastatin 10milliGRAM(s) Oral at bedtime  losartan 50milliGRAM(s) Oral daily  pantoprazole    Tablet 40milliGRAM(s) Oral before breakfast  tamsulosin 0.4milliGRAM(s) Oral at bedtime  insulin lispro (HumaLOG) corrective regimen sliding scale  SubCutaneous three times a day before meals  insulin lispro (HumaLOG) corrective regimen sliding scale  SubCutaneous at bedtime  insulin glargine Injectable (LANTUS) 12Unit(s) SubCutaneous at bedtime  ALBUTerol/ipratropium for Nebulization 3milliLiter(s) Nebulizer every 6 hours  acetylcysteine 10% Inhalation 2milliLiter(s) Inhalation every 6 hours  docusate sodium 100milliGRAM(s) Oral two times a day  methylPREDNISolone sodium succinate Injectable 20milliGRAM(s) IV Push every 8 hours    MEDICATIONS  (PRN):  dextrose Gel 1Dose(s) Oral once PRN Blood Glucose LESS THAN 70 milliGRAM(s)/deciliter  glucagon  Injectable 1milliGRAM(s) IntraMuscular once PRN Glucose LESS THAN 70 milligrams/deciliter  ALBUTerol   0.042% 1.25milliGRAM(s) Nebulizer every 3 hours PRN Shortness of Breath and/or Wheezing  guaiFENesin   Syrup  (Sugar-Free) 200milliGRAM(s) Oral every 6 hours PRN Cough  sodium chloride 0.65% Nasal 1Spray(s) Both Nostrils three times a day PRN Nasal Congestion        ROS: all systems reviewed and wnl      PHYSICAL EXAMINATION:  Vital Signs Last 24 Hrs  T(C): 36.6, Max: 37.1 (02-21 @ 00:19)  T(F): 97.8, Max: 98.8 (02-21 @ 00:19)  HR: 80 (71 - 94)  BP: 126/61 (102/55 - 148/78)  BP(mean): --  RR: 17 (16 - 18)  SpO2: 98% (93% - 100%)  CAPILLARY BLOOD GLUCOSE  231 (21 Feb 2017 11:22)  117 (21 Feb 2017 08:03)  192 (20 Feb 2017 21:34)  129 (20 Feb 2017 17:41)      GENERAL: NAD, well-groomed, well-developed  HEAD:  atraumatic, normocephalic  EYES: sclera anicteric  ENMT: mucous membranes moist  NECK: supple, No JVD  CHEST/LUNG: clear to auscultation bilaterally; no rales, rhonchi, or wheezing b/l  HEART: normal S1, S2  ABDOMEN: BS+, soft, ND, NT   EXTREMITIES:  pulses palpable; no clubbing, cyanosis, or edema b/l LEs  NEURO: awake, alert, interactive; moves all extremities  SKIN: no rashes or lesions      LABS:                        11.3   14.1  )-----------( 369      ( 20 Feb 2017 07:51 )             34.2                   RADIOLOGY & ADDITIONAL TESTS:  None      ASSESSMENT AND PLAN:  60 year old male with COPD exacerbation. Continue IV Zithromax, IV Solumedrol 20 mg every 8 hours, Duonebs every 6 hours, Advair and Spiriva daily.     DM controlled on Lantus 12 units every day. Change cardizem to 120 mg/day. Continue Cozaar 50 mg/day. Continue BIPAP 10/5 30 % at night. Possible change to oral prednisone taper in AM and discharge to home if OK with pulmonary.  Out of bed to chair daily.

## 2021-08-11 NOTE — ED PROVIDER NOTE - CLINICAL SUMMARY MEDICAL DECISION MAKING FREE TEXT BOX
Pt presenting for COVID testing  -Pt does not meet current COVID-19 criteria listed in most updated guidelines as per [facility] protocol/algorithm for admission at this time   -Lengthy discussion with patient regarding home quarantine, follow up with PMD, anticipatory guidance provided and supportive care recommended. Advised immediate return if worsening symptoms, strict return precautions, especially if short of breath, chest pain, inability to tolerate food/liquid. Pt given pre-printed [facility] Novel Coronavirus (COVID19) information packet which contains instructions on time frame of result and how to obtain. Pt verbalized understanding and agreement of plan.

## 2021-08-11 NOTE — ED PROVIDER NOTE - PATIENT PORTAL LINK FT
You can access the FollowMyHealth Patient Portal offered by [facility] by registering at the following website: [email or web address]. By joining [employer]’s FollowMyHealth portal, you will also be able to view your health information using other applications (apps) compatible with our system.

## 2021-09-05 ENCOUNTER — EMERGENCY (EMERGENCY)
Facility: HOSPITAL | Age: 26
LOS: 1 days | End: 2021-09-05
Attending: EMERGENCY MEDICINE
Payer: COMMERCIAL

## 2021-09-05 VITALS
WEIGHT: 169.98 LBS | HEART RATE: 78 BPM | SYSTOLIC BLOOD PRESSURE: 122 MMHG | OXYGEN SATURATION: 97 % | HEIGHT: 76 IN | DIASTOLIC BLOOD PRESSURE: 86 MMHG | RESPIRATION RATE: 16 BRPM | TEMPERATURE: 99 F

## 2021-09-05 LAB — SARS-COV-2 RNA SPEC QL NAA+PROBE: SIGNIFICANT CHANGE UP

## 2021-09-05 PROCEDURE — 99283 EMERGENCY DEPT VISIT LOW MDM: CPT

## 2021-09-05 PROCEDURE — 99282 EMERGENCY DEPT VISIT SF MDM: CPT

## 2021-09-05 PROCEDURE — U0003: CPT

## 2021-09-05 PROCEDURE — U0005: CPT

## 2021-09-05 NOTE — ED STATDOCS - PATIENT PORTAL LINK FT
You can access the FollowMyHealth Patient Portal offered by [facility] by registering at the following website: [email or web address]. By joining [employer]’s FollowMyHealth portal, you will also be able to view your health information using other applications (apps) compatible with our system.

## 2021-09-05 NOTE — ED STATDOCS - OBJECTIVE STATEMENT
27 y/o male no pmh here for covid swab. has mild congestion, gf is pregnant and sick so pt wanted swab. no known exposure. not vaccinated against covid. no sob, no other symptoms.     ROS: No fever/chills.  No chest painNo SOB/cough/. No abdominal pain, N/V/D,No dysuria/frequency.  No headache. No Dizziness.    No rashes  No numbness/weakness

## 2022-12-04 NOTE — ED ADULT TRIAGE NOTE - NS ED NURSE BANDS TYPE
Bed: 05  Expected date:   Expected time:   Means of arrival: [employer]  Comments:  [place] SOB   Name band;

## 2023-05-16 ENCOUNTER — EMERGENCY (EMERGENCY)
Facility: HOSPITAL | Age: 28
LOS: 1 days | Discharge: HOME | End: 2023-05-16
Attending: EMERGENCY MEDICINE | Admitting: EMERGENCY MEDICINE
Payer: MEDICAID

## 2023-05-16 VITALS
WEIGHT: 175.05 LBS | DIASTOLIC BLOOD PRESSURE: 83 MMHG | SYSTOLIC BLOOD PRESSURE: 144 MMHG | OXYGEN SATURATION: 98 % | TEMPERATURE: 98 F | RESPIRATION RATE: 14 BRPM | HEART RATE: 84 BPM | HEIGHT: 76 IN

## 2023-05-16 VITALS
OXYGEN SATURATION: 98 % | SYSTOLIC BLOOD PRESSURE: 136 MMHG | DIASTOLIC BLOOD PRESSURE: 90 MMHG | RESPIRATION RATE: 16 BRPM | TEMPERATURE: 98 F | HEART RATE: 75 BPM

## 2023-05-16 LAB
ALBUMIN SERPL ELPH-MCNC: 4.5 G/DL — SIGNIFICANT CHANGE UP (ref 3.3–5)
ALP SERPL-CCNC: 63 U/L — SIGNIFICANT CHANGE UP (ref 30–120)
ALT FLD-CCNC: 51 U/L DA — SIGNIFICANT CHANGE UP (ref 10–60)
ANION GAP SERPL CALC-SCNC: 12 MMOL/L — SIGNIFICANT CHANGE UP (ref 5–17)
APPEARANCE UR: CLEAR — SIGNIFICANT CHANGE UP
AST SERPL-CCNC: 81 U/L — HIGH (ref 10–40)
BASOPHILS # BLD AUTO: 0.07 K/UL — SIGNIFICANT CHANGE UP (ref 0–0.2)
BASOPHILS NFR BLD AUTO: 1 % — SIGNIFICANT CHANGE UP (ref 0–2)
BILIRUB SERPL-MCNC: 0.7 MG/DL — SIGNIFICANT CHANGE UP (ref 0.2–1.2)
BILIRUB UR-MCNC: NEGATIVE — SIGNIFICANT CHANGE UP
BUN SERPL-MCNC: 9 MG/DL — SIGNIFICANT CHANGE UP (ref 7–23)
CALCIUM SERPL-MCNC: 9.9 MG/DL — SIGNIFICANT CHANGE UP (ref 8.4–10.5)
CHLORIDE SERPL-SCNC: 97 MMOL/L — SIGNIFICANT CHANGE UP (ref 96–108)
CO2 SERPL-SCNC: 28 MMOL/L — SIGNIFICANT CHANGE UP (ref 22–31)
COLOR SPEC: YELLOW — SIGNIFICANT CHANGE UP
CREAT SERPL-MCNC: 1.09 MG/DL — SIGNIFICANT CHANGE UP (ref 0.5–1.3)
D DIMER BLD IA.RAPID-MCNC: <150 NG/ML DDU — SIGNIFICANT CHANGE UP
DIFF PNL FLD: NEGATIVE — SIGNIFICANT CHANGE UP
EGFR: 94 ML/MIN/1.73M2 — SIGNIFICANT CHANGE UP
EOSINOPHIL # BLD AUTO: 0.11 K/UL — SIGNIFICANT CHANGE UP (ref 0–0.5)
EOSINOPHIL NFR BLD AUTO: 1.6 % — SIGNIFICANT CHANGE UP (ref 0–6)
GLUCOSE SERPL-MCNC: 101 MG/DL — HIGH (ref 70–99)
GLUCOSE UR QL: NEGATIVE MG/DL — SIGNIFICANT CHANGE UP
HCT VFR BLD CALC: 43.4 % — SIGNIFICANT CHANGE UP (ref 39–50)
HGB BLD-MCNC: 14.8 G/DL — SIGNIFICANT CHANGE UP (ref 13–17)
IMM GRANULOCYTES NFR BLD AUTO: 0.1 % — SIGNIFICANT CHANGE UP (ref 0–0.9)
KETONES UR-MCNC: NEGATIVE MG/DL — SIGNIFICANT CHANGE UP
LEUKOCYTE ESTERASE UR-ACNC: NEGATIVE — SIGNIFICANT CHANGE UP
LIDOCAIN IGE QN: 104 U/L — SIGNIFICANT CHANGE UP (ref 73–393)
LYMPHOCYTES # BLD AUTO: 1.77 K/UL — SIGNIFICANT CHANGE UP (ref 1–3.3)
LYMPHOCYTES # BLD AUTO: 25.7 % — SIGNIFICANT CHANGE UP (ref 13–44)
MCHC RBC-ENTMCNC: 28.5 PG — SIGNIFICANT CHANGE UP (ref 27–34)
MCHC RBC-ENTMCNC: 34.1 GM/DL — SIGNIFICANT CHANGE UP (ref 32–36)
MCV RBC AUTO: 83.6 FL — SIGNIFICANT CHANGE UP (ref 80–100)
MONOCYTES # BLD AUTO: 0.49 K/UL — SIGNIFICANT CHANGE UP (ref 0–0.9)
MONOCYTES NFR BLD AUTO: 7.1 % — SIGNIFICANT CHANGE UP (ref 2–14)
NEUTROPHILS # BLD AUTO: 4.44 K/UL — SIGNIFICANT CHANGE UP (ref 1.8–7.4)
NEUTROPHILS NFR BLD AUTO: 64.5 % — SIGNIFICANT CHANGE UP (ref 43–77)
NITRITE UR-MCNC: NEGATIVE — SIGNIFICANT CHANGE UP
NRBC # BLD: 0 /100 WBCS — SIGNIFICANT CHANGE UP (ref 0–0)
NT-PROBNP SERPL-SCNC: 16 PG/ML — SIGNIFICANT CHANGE UP (ref 0–125)
PH UR: 7.5 — SIGNIFICANT CHANGE UP (ref 5–8)
PLATELET # BLD AUTO: 213 K/UL — SIGNIFICANT CHANGE UP (ref 150–400)
POTASSIUM SERPL-MCNC: 3.9 MMOL/L — SIGNIFICANT CHANGE UP (ref 3.5–5.3)
POTASSIUM SERPL-SCNC: 3.9 MMOL/L — SIGNIFICANT CHANGE UP (ref 3.5–5.3)
PROT SERPL-MCNC: 8.4 G/DL — HIGH (ref 6–8.3)
PROT UR-MCNC: NEGATIVE MG/DL — SIGNIFICANT CHANGE UP
RBC # BLD: 5.19 M/UL — SIGNIFICANT CHANGE UP (ref 4.2–5.8)
RBC # FLD: 13.7 % — SIGNIFICANT CHANGE UP (ref 10.3–14.5)
SODIUM SERPL-SCNC: 137 MMOL/L — SIGNIFICANT CHANGE UP (ref 135–145)
SP GR SPEC: 1 — LOW (ref 1–1.03)
TROPONIN I, HIGH SENSITIVITY RESULT: 4.1 NG/L — SIGNIFICANT CHANGE UP
UROBILINOGEN FLD QL: 0.2 MG/DL — SIGNIFICANT CHANGE UP (ref 0.2–1)
WBC # BLD: 6.89 K/UL — SIGNIFICANT CHANGE UP (ref 3.8–10.5)
WBC # FLD AUTO: 6.89 K/UL — SIGNIFICANT CHANGE UP (ref 3.8–10.5)

## 2023-05-16 PROCEDURE — 74177 CT ABD & PELVIS W/CONTRAST: CPT | Mod: MA

## 2023-05-16 PROCEDURE — 85379 FIBRIN DEGRADATION QUANT: CPT

## 2023-05-16 PROCEDURE — 71046 X-RAY EXAM CHEST 2 VIEWS: CPT

## 2023-05-16 PROCEDURE — 81003 URINALYSIS AUTO W/O SCOPE: CPT

## 2023-05-16 PROCEDURE — 74177 CT ABD & PELVIS W/CONTRAST: CPT | Mod: 26,MA

## 2023-05-16 PROCEDURE — 96374 THER/PROPH/DIAG INJ IV PUSH: CPT | Mod: XU

## 2023-05-16 PROCEDURE — 85025 COMPLETE CBC W/AUTO DIFF WBC: CPT

## 2023-05-16 PROCEDURE — 99285 EMERGENCY DEPT VISIT HI MDM: CPT | Mod: 25

## 2023-05-16 PROCEDURE — 93010 ELECTROCARDIOGRAM REPORT: CPT

## 2023-05-16 PROCEDURE — 84484 ASSAY OF TROPONIN QUANT: CPT

## 2023-05-16 PROCEDURE — 80053 COMPREHEN METABOLIC PANEL: CPT

## 2023-05-16 PROCEDURE — 93005 ELECTROCARDIOGRAM TRACING: CPT

## 2023-05-16 PROCEDURE — 83690 ASSAY OF LIPASE: CPT

## 2023-05-16 PROCEDURE — 83880 ASSAY OF NATRIURETIC PEPTIDE: CPT

## 2023-05-16 PROCEDURE — 99285 EMERGENCY DEPT VISIT HI MDM: CPT

## 2023-05-16 PROCEDURE — 36415 COLL VENOUS BLD VENIPUNCTURE: CPT

## 2023-05-16 PROCEDURE — 71046 X-RAY EXAM CHEST 2 VIEWS: CPT | Mod: 26

## 2023-05-16 RX ORDER — FAMOTIDINE 10 MG/ML
20 INJECTION INTRAVENOUS ONCE
Refills: 0 | Status: COMPLETED | OUTPATIENT
Start: 2023-05-16 | End: 2023-05-16

## 2023-05-16 RX ORDER — SODIUM CHLORIDE 9 MG/ML
1000 INJECTION INTRAMUSCULAR; INTRAVENOUS; SUBCUTANEOUS ONCE
Refills: 0 | Status: COMPLETED | OUTPATIENT
Start: 2023-05-16 | End: 2023-05-16

## 2023-05-16 RX ADMIN — SODIUM CHLORIDE 1000 MILLILITER(S): 9 INJECTION INTRAMUSCULAR; INTRAVENOUS; SUBCUTANEOUS at 13:37

## 2023-05-16 RX ADMIN — FAMOTIDINE 20 MILLIGRAM(S): 10 INJECTION INTRAVENOUS at 12:56

## 2023-05-16 RX ADMIN — SODIUM CHLORIDE 1000 MILLILITER(S): 9 INJECTION INTRAMUSCULAR; INTRAVENOUS; SUBCUTANEOUS at 12:56

## 2023-05-16 NOTE — ED ADULT NURSE NOTE - OBJECTIVE STATEMENT
Otherwise healthy 28 year old male presents to the ED from home with complaint of generalized, intermittent chest pain that he describes as tightness since 0500 today.  Endorses constant SOB with tingling in bilateral hands, feet and legs.  Recent travel to [place] within the past month.  Patient states, "I have been binge drinking since the death of my son and I think I'm dehydrated."  As per patient their infant son  unexpectedly in the past few months.  Patient reports a history of anxiety.  Denies drug use beyond ETOH.  Reports nausea at times.  Denies fever, dizziness, diarrhea or any other complaints.  Significant other at bedside.

## 2023-05-16 NOTE — ED PROVIDER NOTE - NS ED ATTENDING STATEMENT MOD
This was a shared visit with the JOSIAS. I reviewed and verified the documentation and independently performed the documented:

## 2023-05-16 NOTE — ED PROVIDER NOTE - PROGRESS NOTE DETAILS
Reevaluated patient at bedside.  Patient feeling much improved.  Discussed the results of all diagnostic testing in ED and copies of all reports given. Informed about abnormal findings on ct scan. advised to f/u with pcp and GI. Pt has hx of small pellets in his gluteus. Advised to stop drinking etoh, f/u therapist. An opportunity to ask questions was given.  Discussed the importance of prompt, close medical follow-up.  Patient will return with any changes, concerns or persistent / worsening symptoms.  Understanding of all instructions verbalized.

## 2023-05-16 NOTE — ED PROVIDER NOTE - PATIENT PORTAL LINK FT
You can access the FollowMyHealth Patient Portal offered by [facility] by registering at the following website: [email or web address]. By joining [employer]’s FollowMyHealth portal, you will also be able to view your health information using other applications (apps) compatible with our system.

## 2023-05-16 NOTE — ED PROVIDER NOTE - CARE PLAN
1 Principal Discharge DX:	Nonspecific chest pain  Secondary Diagnosis:	Dyspnea  Secondary Diagnosis:	Abdominal pain

## 2023-05-16 NOTE — ED PROVIDER NOTE - CLINICAL SUMMARY MEDICAL DECISION MAKING FREE TEXT BOX
28-year-old male with history of anxiety presents with complaint of shortness of breath and chest pain today.  Patient states that generalized intermittent chest tightness since 5 AM this morning.  States that he has constant shortness of breath with tingling in both hands and feet. States that he has had similar symptoms in the past was diagnosed with anxiety. States that he recently traveled to [place] 2 to 3 weeks ago.  Patient states that he has been drinking a lot of alcohol since his infant son  unexpectedly over a month ago.  States that he feels like he is dehydrated.  States that his abdomen also feels bloated and he has some discomfort to his right lower abdomen.  Admits to occasional nausea. Denies vomiting, diarrhea, blood in stool, fever, calf pain/swelling, history of DVT/PE, SI/HI, other drug use or other symptoms.    VSS Afebrile, NAD  HEENT - clear  PERRL EOMI  Neck supple  lungs clear  Cor S1S2 RR - MGR  Abd soft nontender, no mass or HSM, no rebound  Ext FROM intact, no edema  Neuro Intact, no deficits.  Skin Warm and dry no rash.  Imp- Dyspnea, CP. Likely anxiety related. Doubt cardiac cause. Plan - labs, with trop. CXR, EKG. If ER workup neg, May need outpt cardiac and psych follow up.

## 2023-05-16 NOTE — ED PROVIDER NOTE - PROVIDER TOKENS
FREE:[LAST:[YOUR PMD],PHONE:[(   )    -],FAX:[(   )    -],FOLLOWUP:[1-3 Days]],PROVIDER:[TOKEN:[[id number]:MIIS:[id number]],FOLLOWUP:[1-3 Days]]

## 2023-05-16 NOTE — ED PROVIDER NOTE - NSFOLLOWUPINSTRUCTIONS_ED_ALL_ED_FT
Follow-up with your PCP for reevaluation, ongoing care and treatment.  Follow-up with gastroenterologist and therapist as discussed.  Stop drinking alcohol.  If having worsening of symptoms, fever, vomiting or other related symptoms, return to the ER immediately.    Nonspecific Chest Pain  Chest pain can be caused by many different conditions. Some causes of chest pain can be life-threatening. These will require treatment right away. Serious causes of chest pain include:  Heart attack.  A tear in the body's main blood vessel.  Redness and swelling (inflammation) around your heart.  Blood clot in your lungs.  Other causes of chest pain may not be so serious. These include:  Heartburn.  Anxiety or stress.  Damage to bones or muscles in your chest.  Lung infections.  Chest pain can feel like:  Pain or discomfort in your chest.  Crushing, pressure, aching, or squeezing pain.  Burning or tingling.  Dull or sharp pain that is worse when you move, cough, or take a deep breath.  Pain or discomfort that is also felt in your back, neck, jaw, shoulder, or arm, or pain that spreads to any of these areas.  It is hard to know whether your pain is caused by something that is serious or something that is not so serious. So it is important to see your doctor right away if you have chest pain.    Follow these instructions at home:  Medicines    Take over-the-counter and prescription medicines only as told by your doctor.  If you were prescribed an antibiotic medicine, take it as told by your doctor. Do not stop taking the antibiotic even if you start to feel better.  Lifestyle    A plate along with examples of foods in a healthy diet.  Rest as told by your doctor.  Do not use any products that contain nicotine or tobacco, such as cigarettes, e-cigarettes, and chewing tobacco. If you need help quitting, ask your doctor.  Do not drink alcohol.  Make lifestyle changes as told by your doctor. These may include:  Getting regular exercise. Ask your doctor what activities are safe for you.  Eating a heart-healthy diet. A diet and nutrition specialist (dietitian) can help you to learn healthy eating options.  Staying at a healthy weight.  Treating diabetes or high blood pressure, if needed.  Lowering your stress. Activities such as yoga and relaxation techniques can help.  General instructions    Pay attention to any changes in your symptoms. Tell your doctor about them or any new symptoms.  Avoid any activities that cause chest pain.  Keep all follow-up visits as told by your doctor. This is important. You may need more testing if your chest pain does not go away.  Contact a doctor if:  Your chest pain does not go away.  You feel depressed.  You have a fever.  Get help right away if:  Your chest pain is worse.  You have a cough that gets worse, or you cough up blood.  You have very bad (severe) pain in your belly (abdomen).  You pass out (faint).  You have either of these for no clear reason:  Sudden chest discomfort.  Sudden discomfort in your arms, back, neck, or jaw.  You have shortness of breath at any time.  You suddenly start to sweat, or your skin gets clammy.  You feel sick to your stomach (nauseous).  You throw up (vomit).  You suddenly feel lightheaded or dizzy.  You feel very weak or tired.  Your heart starts to beat fast, or it feels like it is skipping beats.  These symptoms may be an emergency. Do not wait to see if the symptoms will go away. Get medical help right away. Call your local emergency services (911 in the [place] Do not drive yourself to the hospital.    Summary  Chest pain can be caused by many different conditions. The cause may be serious and need treatment right away. If you have chest pain, see your doctor right away.  Follow your doctor's instructions for taking medicines and making lifestyle changes.  Keep all follow-up visits as told by your doctor. This includes visits for any further testing if your chest pain does not go away.  Be sure to know the signs that show that your condition has become worse. Get help right away if you have these symptoms.  This information is not intended to replace advice given to you by your health care provider. Make sure you discuss any questions you have with your health care provider.

## 2023-05-16 NOTE — ED PROVIDER NOTE - OBJECTIVE STATEMENT
28-year-old male with history of anxiety presents with complaint of shortness of breath and chest pain today.  Patient states that generalized intermittent chest tightness since 5 AM this morning.  States that he has constant shortness of breath with tingling in both hands and feet. States that he has had similar symptoms in the past was diagnosed with anxiety. States that he recently traveled to [place] 2 to 3 weeks ago.  Patient states that he has been drinking a lot of alcohol since his infant son  unexpectedly over a month ago.  States that he feels like he is dehydrated.  States that his abdomen also feels bloated and he has some discomfort to his right lower abdomen.  Admits to occasional nausea. Denies vomiting, diarrhea, blood in stool, fever, calf pain/swelling, history of DVT/PE, SI/HI, other drug use or other symptoms.

## 2023-05-16 NOTE — ED PROVIDER NOTE - CARE PROVIDER_API CALL
YOUR PMD,   Phone: (   )    -  Fax: (   )    -  Follow Up Time: 1-3 Days    Contreras Moore (DO)  Internal Medicine  [address] [place] [address]  Phone: [phone]  Fax: [phone]  Follow Up Time: 1-3 Days

## 2023-05-16 NOTE — ED ADULT NURSE NOTE - NSFALLUNIVINTERV_ED_ALL_ED
Bed/Stretcher in lowest position, wheels locked, appropriate side rails in place/Call bell, personal items and telephone in reach/Instruct patient to call for assistance before getting out of bed/chair/stretcher/Non-slip footwear applied when patient is off stretcher/[place] to call system/Physically safe environment - no spills, clutter or unnecessary equipment/Purposeful proactive rounding/Room/bathroom lighting operational, light cord in reach

## 2024-08-02 NOTE — ED PROVIDER NOTE - NSFOLLOWUPINSTRUCTIONS_ED_ALL_ED_FT
Pulmonary/Critical Care Progress Note      PATIENT NAME: Tray Atwood  MRN: [id number]  TODAY'S DATE: 2024  7:51 AM  ADMIT DATE: 2024  AGE: 54 y.o. : 1970    CONSULT REQUESTED BY: Chuck Monroy DO    REASON FOR CONSULT:   Critical care management    HPI:  The patient is a 54-year-old who had just gotten out of the hospital with a COPD exacerbation when he developed increasing abdominal pain.  Was found to have a perforated viscus and was transferred to [facility].  He underwent laparotomy yesterday where presumed perforated diverticulum was found.  He had diffuse purulent fluid.  Dr. Turcios was able to perform anastomosis after performing a sigmoidectomy.  The patient is currently on the ventilator and on significant pressors but is wide awake on 50 of propofol and 250 of fentanyl.  He will be extubated.     the patient is doing well.  He has been off pressors since noon yesterday.  His art line and Mackenzie are out.  He has hypoactive bowel sounds but no flatus as of yet.     the patient has been vomiting all day.  He is retching.  Dr. Small has not been notified.  I have reached out to him and he will call back to the unit for an NG tube insertion to deal with his ileus.  I have given the patient's 6.25 of Phenergan.     the patient is feeling much better today.  Attempts to place an NG tube were unsuccessful x5 he tells me.  Is having significant flatus.  He is hungry.  He would really like some juice.     The patient is progressing.  He had coffee this morning and is happy.    REVIEW OF SYSTEMS  GENERAL: Feeling better  EYES: Vision is good.  ENT: No sinusitis or pharyngitis.   HEART: No chest pain or palpitations.  LUNGS:  He was just hospitalized for COPD exacerbation.  He is smoking again.  GI:  Nausea and vomiting have resolved.  He has had a large amount of flatus.  : No dysuria, hesitancy, or nocturia.  SKIN: No lesions or rashes.  MUSCULOSKELETAL:  He has  chronic hip and knee pain.  NEURO: No headaches or neuropathy.  LYMPH: No edema or adenopathy.  PSYCH: No anxiety or depression.  ENDO: No weight change.    No change in the patient's Past Medical History, Past Surgical History, Social History or Family History since admission.      VITAL SIGNS (MOST RECENT)  Temp: 98.2 °F (36.8 °C) (08/02/24 0759)  Pulse: 85 (08/02/24 0759)  Resp: 18 (08/02/24 0759)  BP: (!) 144/93 (08/02/24 0759)  SpO2: 95 % (08/02/24 0759)    INTAKE AND OUTPUT (LAST 24 HOURS):  Intake/Output Summary (Last 24 hours) at 8/2/2024 0823  Last data filed at 8/2/2024 0802  Gross per 24 hour   Intake 4304.25 ml   Output 1825 ml   Net 2479.25 ml       WEIGHT  Wt Readings from Last 1 Encounters:   07/29/24 90.6 kg (199 lb 11.8 oz)       PHYSICAL EXAM  GENERAL: Older appearing patient in no distress.  HEENT: Pupils equal and reactive. Extraocular movements intact. Nose with NG tube.. Pharynx moist.  Dentition poor  NECK: Supple.   HEART: Regular rate and rhythm. No murmur or gallop auscultated.  LUNGS: Breath sounds are coarse bilaterally. Lung excursion symmetrical. No change in fremitus. No adventitial noises.  ABDOMEN:   Bowel sounds present. Non-tender, no masses palpated.   : Normal anatomy.    EXTREMITIES: Normal muscle tone and joint movement, no cyanosis or clubbing.   LYMPHATICS: No adenopathy palpated, no edema.  SKIN: Dry, intact, no lesions.   NEURO: Cranial nerves II-XII intact. Motor strength 5/5 bilaterally, upper and lower extremities.  PSYCH: Appropriate affect      CBC LAST (LAST 24 HOURS)  Recent Labs   Lab 08/02/24  0532   WBC 7.60   RBC 4.31*   HGB 11.7*   HCT 36.1*   MCV 84   MCH 27.1   MCHC 32.4   RDW 14.1      MPV 10.1   GRAN 55.3  4.2   LYMPH 17.5*  1.3   MONO 17.6*  1.3*   BASO 0.03   NRBC 0         CHEMISTRY LAST (LAST 24 HOURS)  Recent Labs   Lab 08/02/24  0532      K 3.4*      CO2 27   ANIONGAP 7*   BUN 7   CREATININE 0.7   GLU 94   CALCIUM 8.1*          LAST 7 DAYS MICROBIOLOGY   Microbiology Results (last 7 days)       Procedure Component Value Units Date/Time    AFB Culture & Smear [[id number]] Collected: 07/28/24 2228    Order Status: Completed Specimen: Abscess from Abdomen Updated: 07/31/24 1524     AFB CULTURE STAIN No acid fast bacilli seen.     AFB CULTURE STAIN Testing performed by:     AFB CULTURE STAIN Lab [facility] [place]     [facility]     [place] [address]     AFB CULTURE STAIN Dr. Junior Cespedes MD    Culture, Anaerobe [[id number]] Collected: 07/28/24 2228    Order Status: Completed Specimen: Abscess from Abdomen Updated: 07/31/24 1509     Anaerobic Culture Culture in progress    Culture, Respiratory with Gram Stain [[id number]] Collected: 07/29/24 0222    Order Status: Completed Specimen: Sputum Updated: 07/31/24 1035     Respiratory Culture Reduced normal respiratory jitendra     Gram Stain (Respiratory) <10 epithelial cells per low power field.     Gram Stain (Respiratory) Few  WBC's     Gram Stain (Respiratory) Few yeast     Gram Stain (Respiratory) Rare Gram positive cocci     Gram Stain (Respiratory) Rare Gram positive rods    Aerobic culture [[id number]]  (Abnormal)  (Susceptibility) Collected: 07/28/24 2228    Order Status: Completed Specimen: Abscess from Abdomen Updated: 07/31/24 0831     Aerobic Bacterial Culture ESCHERICHIA COLI  Rare  Identification and susceptibility pending        ENTEROCOCCUS FAECALIS  Rare  Identification and susceptibility pending      Gram stain [[id number]] Collected: 07/28/24 2228    Order Status: Completed Specimen: Abscess from Abdomen Updated: 07/29/24 0932     Gram Stain Result Moderate WBC's      No organisms seen    Culture, Respiratory with Gram Stain [[id number]]     Order Status: No result Specimen: Respiratory     Fungus culture [[id number]] Collected: 07/28/24 2228    Order Status: Sent Specimen: Abscess from Abdomen Updated: 07/28/24 2310     Blood culture [[id number]]     Order Status: Sent Specimen: Blood     Blood culture [[id number]]     Order Status: Sent Specimen: Blood             MOST RECENT IMAGING  X-Ray Chest AP Portable  Narrative: EXAMINATION:  XR CHEST AP PORTABLE    CLINICAL HISTORY:  NG tube placement;    TECHNIQUE:  Single frontal view of the chest was performed.    COMPARISON:  Chest x-ray 07/29/2024.    FINDINGS:  NG tube is now seen coiled within the distal esophagus with the proximal tip projected over the mid esophagus at least 9 cm above the gastroesophageal junction.  Repositioning recommended.  Nonobstructive bowel gas pattern.  Scattered opacities of the lung bases partially imaged.  Impression: Malpositioned enteric tube as above.  Recommend repositioning.    Electronically signed by: Damon Blackman  Date:    07/31/2024  Time:    19:01      CURRENT VISIT EKG  No results found for this visit on 07/28/24.            Oxygen INFORMATION     2 liters         LAST ARTERIAL BLOOD GAS  ABG  Recent Labs   Lab 07/29/24  0736   PH 7.363   PO2 98   PCO2 46.1*   HCO3 26.2   BE 1       IMPRESSION AND PLAN  Perforated diverticulum with purulent peritonitis  - sigmoidectomy and end to end anastomosis performed  - on Zosyn  Septic shock  - resolved  Acute on chronic hypercapnic hypoxemic respiratory failure with mechanical ventilation  - now extubated  - has been smoking again  - uses Breo and Ventolin at home  - on 2 L nasal cannula at home  Alcoholism  - 6 beers every other day  - CIWA scale  - po thiamine and folate  Opioid addiction  - 2 roxicodone from the street every other day and marijuana use  - will need p.r.n. narcotics for his peritonitis  Leukocytosis  - resolved  - continue Zosyn/Augmentin for 7 days  Anemia  - multifactorial  - stable    Please call if I can be of assistance.  Patient should resume his Breo and prn albuterol on discharge.  Patient should continue wearing his oxygen.    Soniya [place] MD  Date of Service: 08/02/2024   7:51 AM    Cough    Coughing is a reflex that clears your throat and your airways. Coughing helps to heal and protect your lungs. It is normal to cough occasionally, but a cough that happens with other symptoms or lasts a long time may be a sign of a condition that needs treatment. Coughing may be caused by infections, asthma or COPD, smoking, postnasal drip, gastroesophageal reflux, as well as other medical conditions. Take medicines only as instructed by your health care provider. Avoid environments or triggers that causes you to cough at work or at home.    SEEK IMMEDIATE MEDICAL CARE IF YOU HAVE ANY OF THE FOLLOWING SYMPTOMS: coughing up blood, shortness of breath, rapid heart rate, chest pain, unexplained weight loss or night sweats.    You were tested for COVID19 during your visit in the emergency department. The results will take 1-2 days to come back. Do not return to work until your COVID test results as negative. Plan to self-quarantine yourself until this result is available. Avoid contact with others. Wash your hands frequently. If you develop worsening symptoms- such as respiratory distress, confusion, severe weakness, or anything new or concerning, please return to the emergency department to be evaluated.    please make sure call back ( [phone]) within 1-2 days of discharge if you did not get rsult Cough  please continue day quil or night quil as need it for fever or cough   call and follow up with pcp within 2-3 days   Coughing is a reflex that clears your throat and your airways. Coughing helps to heal and protect your lungs. It is normal to cough occasionally, but a cough that happens with other symptoms or lasts a long time may be a sign of a condition that needs treatment. Coughing may be caused by infections, asthma or COPD, smoking, postnasal drip, gastroesophageal reflux, as well as other medical conditions. Take medicines only as instructed by your health care provider. Avoid environments or triggers that causes you to cough at work or at home.    SEEK IMMEDIATE MEDICAL CARE IF YOU HAVE ANY OF THE FOLLOWING SYMPTOMS: coughing up blood, shortness of breath, rapid heart rate, chest pain, unexplained weight loss or night sweats.      You were tested for COVID19 during your visit in the emergency department. The results will take 1-2 days to come back. Do not return to work until your COVID test results as negative. Plan to self-quarantine yourself until this result is available. Avoid contact with others. Wash your hands frequently. If you develop worsening symptoms- such as respiratory distress, confusion, severe weakness, or anything new or concerning, please return to the emergency department to be evaluated.    please make sure call back ( [phone]) within 1-2 days of discharge if you did not get rsult

## 2024-08-09 NOTE — ED PROVIDER NOTE - ENMT NEGATIVE STATEMENT, MLM
What Is The Reason For Today's Visit?: Preventative Skin Check
Ears: no ear pain and no hearing problems.Nose: no nasal congestion and no nasal drainage.Mouth/Throat: no dysphagia, no hoarseness and no throat pain.Neck: no lumps, no pain, no stiffness and no swollen glands.